# Patient Record
Sex: MALE | Race: WHITE | NOT HISPANIC OR LATINO | Employment: UNEMPLOYED | URBAN - NONMETROPOLITAN AREA
[De-identification: names, ages, dates, MRNs, and addresses within clinical notes are randomized per-mention and may not be internally consistent; named-entity substitution may affect disease eponyms.]

---

## 2017-04-12 ENCOUNTER — HOSPITAL ENCOUNTER (EMERGENCY)
Facility: HOSPITAL | Age: 37
Discharge: HOME | End: 2017-04-12
Admitting: EMERGENCY MEDICINE

## 2017-04-12 VITALS
WEIGHT: 140 LBS | HEIGHT: 73 IN | HEART RATE: 78 BPM | OXYGEN SATURATION: 99 % | BODY MASS INDEX: 18.55 KG/M2 | SYSTOLIC BLOOD PRESSURE: 108 MMHG | TEMPERATURE: 98.1 F | DIASTOLIC BLOOD PRESSURE: 87 MMHG | RESPIRATION RATE: 16 BRPM

## 2017-04-12 DIAGNOSIS — F19.10 POLYSUBSTANCE ABUSE (HCC): Primary | ICD-10-CM

## 2017-04-12 LAB
ALBUMIN SERPL-MCNC: 5 G/DL (ref 3.5–5)
ALBUMIN/GLOB SERPL: 2 G/DL (ref 1.5–2.5)
ALP SERPL-CCNC: 70 U/L (ref 40–129)
ALT SERPL W P-5'-P-CCNC: 15 U/L (ref 10–44)
AMPHET+METHAMPHET UR QL: POSITIVE
ANION GAP SERPL CALCULATED.3IONS-SCNC: 4.4 MMOL/L (ref 3.6–11.2)
AST SERPL-CCNC: 19 U/L (ref 10–34)
BARBITURATES UR QL SCN: NEGATIVE
BASOPHILS # BLD AUTO: 0.04 10*3/MM3 (ref 0–0.3)
BASOPHILS NFR BLD AUTO: 0.5 % (ref 0–2)
BENZODIAZ UR QL SCN: NEGATIVE
BILIRUB SERPL-MCNC: 0.4 MG/DL (ref 0.2–1.8)
BILIRUB UR QL STRIP: NEGATIVE
BUN BLD-MCNC: 21 MG/DL (ref 7–21)
BUN/CREAT SERPL: 22.6 (ref 7–25)
BUPRENORPHINE+NOR UR QL SCN: POSITIVE
CALCIUM SPEC-SCNC: 9.3 MG/DL (ref 7.7–10)
CANNABINOIDS SERPL QL: POSITIVE
CHLORIDE SERPL-SCNC: 105 MMOL/L (ref 99–112)
CLARITY UR: CLEAR
CO2 SERPL-SCNC: 29.6 MMOL/L (ref 24.3–31.9)
COCAINE UR QL: NEGATIVE
COLOR UR: ABNORMAL
CREAT BLD-MCNC: 0.93 MG/DL (ref 0.43–1.29)
DEPRECATED RDW RBC AUTO: 42.3 FL (ref 37–54)
EOSINOPHIL # BLD AUTO: 0.24 10*3/MM3 (ref 0–0.7)
EOSINOPHIL NFR BLD AUTO: 3.2 % (ref 0–5)
ERYTHROCYTE [DISTWIDTH] IN BLOOD BY AUTOMATED COUNT: 13 % (ref 11.5–14.5)
ETHANOL BLD-MCNC: <10 MG/DL
ETHANOL UR QL: <0.01 %
GFR SERPL CREATININE-BSD FRML MDRD: 91 ML/MIN/1.73
GLOBULIN UR ELPH-MCNC: 2.5 GM/DL
GLUCOSE BLD-MCNC: 113 MG/DL (ref 70–110)
GLUCOSE UR STRIP-MCNC: NEGATIVE MG/DL
HCT VFR BLD AUTO: 43.5 % (ref 42–52)
HGB BLD-MCNC: 14.4 G/DL (ref 14–18)
HGB UR QL STRIP.AUTO: NEGATIVE
IMM GRANULOCYTES # BLD: 0 10*3/MM3 (ref 0–0.03)
IMM GRANULOCYTES NFR BLD: 0 % (ref 0–0.5)
KETONES UR QL STRIP: ABNORMAL
LEUKOCYTE ESTERASE UR QL STRIP.AUTO: NEGATIVE
LYMPHOCYTES # BLD AUTO: 3.38 10*3/MM3 (ref 1–3)
LYMPHOCYTES NFR BLD AUTO: 44.9 % (ref 21–51)
MCH RBC QN AUTO: 30.2 PG (ref 27–33)
MCHC RBC AUTO-ENTMCNC: 33.1 G/DL (ref 33–37)
MCV RBC AUTO: 91.2 FL (ref 80–94)
METHADONE UR QL SCN: NEGATIVE
MONOCYTES # BLD AUTO: 0.82 10*3/MM3 (ref 0.1–0.9)
MONOCYTES NFR BLD AUTO: 10.9 % (ref 0–10)
NEUTROPHILS # BLD AUTO: 3.04 10*3/MM3 (ref 1.4–6.5)
NEUTROPHILS NFR BLD AUTO: 40.5 % (ref 30–70)
NITRITE UR QL STRIP: NEGATIVE
OPIATES UR QL: NEGATIVE
OSMOLALITY SERPL CALC.SUM OF ELEC: 281.3 MOSM/KG (ref 273–305)
OXYCODONE UR QL SCN: NEGATIVE
PCP UR QL SCN: NEGATIVE
PH UR STRIP.AUTO: <=5 [PH] (ref 5–8)
PLATELET # BLD AUTO: 347 10*3/MM3 (ref 130–400)
PMV BLD AUTO: 9.9 FL (ref 6–10)
POTASSIUM BLD-SCNC: 3.9 MMOL/L (ref 3.5–5.3)
PROPOXYPH UR QL: NEGATIVE
PROT SERPL-MCNC: 7.5 G/DL (ref 6–8)
PROT UR QL STRIP: NEGATIVE
RBC # BLD AUTO: 4.77 10*6/MM3 (ref 4.7–6.1)
SODIUM BLD-SCNC: 139 MMOL/L (ref 135–153)
SP GR UR STRIP: >1.03 (ref 1–1.03)
UROBILINOGEN UR QL STRIP: ABNORMAL
WBC NRBC COR # BLD: 7.52 10*3/MM3 (ref 4.5–12.5)

## 2017-04-12 PROCEDURE — 80307 DRUG TEST PRSMV CHEM ANLYZR: CPT | Performed by: EMERGENCY MEDICINE

## 2017-04-12 PROCEDURE — 85025 COMPLETE CBC W/AUTO DIFF WBC: CPT | Performed by: EMERGENCY MEDICINE

## 2017-04-12 PROCEDURE — 81003 URINALYSIS AUTO W/O SCOPE: CPT | Performed by: EMERGENCY MEDICINE

## 2017-04-12 PROCEDURE — 99284 EMERGENCY DEPT VISIT MOD MDM: CPT

## 2017-04-12 PROCEDURE — 80053 COMPREHEN METABOLIC PANEL: CPT | Performed by: EMERGENCY MEDICINE

## 2018-03-18 ENCOUNTER — HOSPITAL ENCOUNTER (EMERGENCY)
Facility: HOSPITAL | Age: 38
Discharge: HOME | End: 2018-03-19
Attending: EMERGENCY MEDICINE | Admitting: EMERGENCY MEDICINE

## 2018-03-18 ENCOUNTER — HOSPITAL ENCOUNTER (EMERGENCY)
Facility: HOSPITAL | Age: 38
Discharge: HOME | End: 2018-03-18
Attending: EMERGENCY MEDICINE | Admitting: EMERGENCY MEDICINE

## 2018-03-18 VITALS
HEART RATE: 60 BPM | OXYGEN SATURATION: 100 % | SYSTOLIC BLOOD PRESSURE: 127 MMHG | HEIGHT: 72 IN | WEIGHT: 150 LBS | DIASTOLIC BLOOD PRESSURE: 89 MMHG | TEMPERATURE: 97.7 F | RESPIRATION RATE: 18 BRPM | BODY MASS INDEX: 20.32 KG/M2

## 2018-03-18 VITALS
TEMPERATURE: 97.6 F | HEIGHT: 72 IN | HEART RATE: 56 BPM | BODY MASS INDEX: 18.96 KG/M2 | OXYGEN SATURATION: 99 % | DIASTOLIC BLOOD PRESSURE: 88 MMHG | SYSTOLIC BLOOD PRESSURE: 147 MMHG | WEIGHT: 140 LBS | RESPIRATION RATE: 18 BRPM

## 2018-03-18 DIAGNOSIS — F19.10 POLYSUBSTANCE ABUSE (HCC): Primary | ICD-10-CM

## 2018-03-18 DIAGNOSIS — F19.10 SUBSTANCE ABUSE (HCC): Primary | ICD-10-CM

## 2018-03-18 LAB
6-ACETYL MORPHINE: NEGATIVE
ALBUMIN SERPL-MCNC: 4.3 G/DL (ref 3.5–5)
ALBUMIN/GLOB SERPL: 1.5 G/DL (ref 1.5–2.5)
ALP SERPL-CCNC: 88 U/L (ref 40–129)
ALT SERPL W P-5'-P-CCNC: 18 U/L (ref 10–44)
AMPHET+METHAMPHET UR QL: NEGATIVE
ANION GAP SERPL CALCULATED.3IONS-SCNC: 3.8 MMOL/L (ref 3.6–11.2)
AST SERPL-CCNC: 20 U/L (ref 10–34)
BARBITURATES UR QL SCN: NEGATIVE
BASOPHILS # BLD AUTO: 0.03 10*3/MM3 (ref 0–0.3)
BASOPHILS NFR BLD AUTO: 0.4 % (ref 0–2)
BENZODIAZ UR QL SCN: NEGATIVE
BILIRUB SERPL-MCNC: 0.2 MG/DL (ref 0.2–1.8)
BILIRUB UR QL STRIP: NEGATIVE
BUN BLD-MCNC: 15 MG/DL (ref 7–21)
BUN/CREAT SERPL: 17 (ref 7–25)
BUPRENORPHINE SERPL-MCNC: NEGATIVE NG/ML
CALCIUM SPEC-SCNC: 9.1 MG/DL (ref 7.7–10)
CANNABINOIDS SERPL QL: POSITIVE
CHLORIDE SERPL-SCNC: 108 MMOL/L (ref 99–112)
CLARITY UR: CLEAR
CO2 SERPL-SCNC: 28.2 MMOL/L (ref 24.3–31.9)
COCAINE UR QL: NEGATIVE
COLOR UR: YELLOW
CREAT BLD-MCNC: 0.88 MG/DL (ref 0.43–1.29)
DEPRECATED RDW RBC AUTO: 42.7 FL (ref 37–54)
EOSINOPHIL # BLD AUTO: 0.15 10*3/MM3 (ref 0–0.7)
EOSINOPHIL NFR BLD AUTO: 1.8 % (ref 0–5)
ERYTHROCYTE [DISTWIDTH] IN BLOOD BY AUTOMATED COUNT: 13.5 % (ref 11.5–14.5)
ETHANOL BLD-MCNC: <10 MG/DL
ETHANOL UR QL: <0.01 %
GFR SERPL CREATININE-BSD FRML MDRD: 97 ML/MIN/1.73
GLOBULIN UR ELPH-MCNC: 2.9 GM/DL
GLUCOSE BLD-MCNC: 84 MG/DL (ref 70–110)
GLUCOSE UR STRIP-MCNC: NEGATIVE MG/DL
HCT VFR BLD AUTO: 43.6 % (ref 42–52)
HGB BLD-MCNC: 14.5 G/DL (ref 14–18)
HGB UR QL STRIP.AUTO: NEGATIVE
IMM GRANULOCYTES # BLD: 0.01 10*3/MM3 (ref 0–0.03)
IMM GRANULOCYTES NFR BLD: 0.1 % (ref 0–0.5)
KETONES UR QL STRIP: NEGATIVE
LEUKOCYTE ESTERASE UR QL STRIP.AUTO: NEGATIVE
LYMPHOCYTES # BLD AUTO: 3.01 10*3/MM3 (ref 1–3)
LYMPHOCYTES NFR BLD AUTO: 35.8 % (ref 21–51)
MCH RBC QN AUTO: 29.4 PG (ref 27–33)
MCHC RBC AUTO-ENTMCNC: 33.3 G/DL (ref 33–37)
MCV RBC AUTO: 88.3 FL (ref 80–94)
METHADONE UR QL SCN: NEGATIVE
MONOCYTES # BLD AUTO: 0.49 10*3/MM3 (ref 0.1–0.9)
MONOCYTES NFR BLD AUTO: 5.8 % (ref 0–10)
NEUTROPHILS # BLD AUTO: 4.72 10*3/MM3 (ref 1.4–6.5)
NEUTROPHILS NFR BLD AUTO: 56.1 % (ref 30–70)
NITRITE UR QL STRIP: NEGATIVE
OPIATES UR QL: NEGATIVE
OSMOLALITY SERPL CALC.SUM OF ELEC: 279.4 MOSM/KG (ref 273–305)
OXYCODONE UR QL SCN: NEGATIVE
PCP UR QL SCN: NEGATIVE
PH UR STRIP.AUTO: 7.5 [PH] (ref 5–8)
PLATELET # BLD AUTO: 419 10*3/MM3 (ref 130–400)
PMV BLD AUTO: 9.3 FL (ref 6–10)
POTASSIUM BLD-SCNC: 4.4 MMOL/L (ref 3.5–5.3)
PROT SERPL-MCNC: 7.2 G/DL (ref 6–8)
PROT UR QL STRIP: NEGATIVE
RBC # BLD AUTO: 4.94 10*6/MM3 (ref 4.7–6.1)
SODIUM BLD-SCNC: 140 MMOL/L (ref 135–153)
SP GR UR STRIP: 1.02 (ref 1–1.03)
UROBILINOGEN UR QL STRIP: NORMAL
WBC NRBC COR # BLD: 8.41 10*3/MM3 (ref 4.5–12.5)

## 2018-03-18 PROCEDURE — 80307 DRUG TEST PRSMV CHEM ANLYZR: CPT | Performed by: PHYSICIAN ASSISTANT

## 2018-03-18 PROCEDURE — 81003 URINALYSIS AUTO W/O SCOPE: CPT | Performed by: PHYSICIAN ASSISTANT

## 2018-03-18 PROCEDURE — 99284 EMERGENCY DEPT VISIT MOD MDM: CPT

## 2018-03-18 PROCEDURE — 80053 COMPREHEN METABOLIC PANEL: CPT | Performed by: PHYSICIAN ASSISTANT

## 2018-03-18 PROCEDURE — 85025 COMPLETE CBC W/AUTO DIFF WBC: CPT | Performed by: PHYSICIAN ASSISTANT

## 2018-03-18 NOTE — ED PROVIDER NOTES
Subjective     Mental Health Problem   Presenting symptoms: no homicidal ideas, no suicidal thoughts, no suicidal threats and no suicide attempt    Presenting symptoms comment:  Substance abuse  Degree of incapacity (severity):  Moderate  Onset quality:  Gradual  Duration: Several months ago.  Timing:  Constant  Progression:  Worsening  Chronicity:  Chronic  Context: drug abuse (methamphetamine and Suboxone)    Context: not alcohol use    Relieved by:  Nothing  Worsened by:  Nothing  Associated symptoms: feelings of worthlessness    Associated symptoms: no abdominal pain and no chest pain    Associated symptoms comment:  Patient reports that he previously had suicidal ideation though has not had any of this recently.      Review of Systems   Constitutional: Negative.  Negative for fever.   HENT: Negative.    Respiratory: Negative.    Cardiovascular: Negative.  Negative for chest pain.   Gastrointestinal: Negative.  Negative for abdominal pain.   Endocrine: Negative.    Genitourinary: Negative.  Negative for dysuria.   Skin: Negative.    Neurological: Negative.    Psychiatric/Behavioral: Negative.  Negative for homicidal ideas and suicidal ideas.   All other systems reviewed and are negative.      Past Medical History:   Diagnosis Date   • Arthritis    • Substance abuse    • Withdrawal symptoms, drug or narcotic        No Known Allergies    History reviewed. No pertinent surgical history.    Family History   Problem Relation Age of Onset   • Bipolar disorder Mother    • No Known Problems Father    • No Known Problems Sister    • No Known Problems Brother    • No Known Problems Maternal Aunt    • No Known Problems Paternal Aunt    • No Known Problems Maternal Uncle    • No Known Problems Paternal Uncle    • No Known Problems Maternal Grandfather    • No Known Problems Maternal Grandmother    • No Known Problems Paternal Grandfather    • No Known Problems Paternal Grandmother    • No Known Problems Cousin        Social  History     Social History   • Marital status:      Social History Main Topics   • Smoking status: Current Every Day Smoker     Packs/day: 1.50     Years: 15.00     Types: Cigarettes   • Smokeless tobacco: Never Used      Comment: Pt. declines counseling at this time.    • Alcohol use No      Comment: Occasional ETOH - Beer - 2 Cans   • Drug use:      Types: IV, Amphetamines, Hydrocodone      Comment: Suboxone, Neurontin, Tramadol   • Sexual activity: Not Currently     Partners: Female     Birth control/ protection: None      Comment: Denies.      Other Topics Concern   • Not on file           Objective   Physical Exam   Constitutional: He is oriented to person, place, and time. He appears well-developed and well-nourished. No distress.   HENT:   Head: Normocephalic and atraumatic.   Right Ear: External ear normal.   Left Ear: External ear normal.   Nose: Nose normal.   Eyes: Conjunctivae and EOM are normal. Pupils are equal, round, and reactive to light.   Neck: Normal range of motion. Neck supple. No JVD present. No tracheal deviation present.   Cardiovascular: Normal rate, regular rhythm and normal heart sounds.    No murmur heard.  Pulmonary/Chest: Effort normal and breath sounds normal. No respiratory distress. He has no wheezes.   Abdominal: Soft. Bowel sounds are normal. There is no tenderness.   Musculoskeletal: Normal range of motion. He exhibits no edema or deformity.   Neurological: He is alert and oriented to person, place, and time. No cranial nerve deficit.   Skin: Skin is warm and dry. No rash noted. He is not diaphoretic. No erythema. No pallor.   Psychiatric: He has a normal mood and affect. His behavior is normal. Thought content normal.   Nursing note and vitals reviewed.      Procedures         ED Course  ED Course                  MDM  Number of Diagnoses or Management Options  Substance abuse: established and worsening     Amount and/or Complexity of Data Reviewed  Clinical lab tests:  reviewed and ordered  Discuss the patient with other providers: yes    Risk of Complications, Morbidity, and/or Mortality  Presenting problems: moderate        Final diagnoses:   Substance abuse            BRINDA Park  03/18/18 1506       BRINDA Park  03/18/18 1506

## 2018-03-18 NOTE — NURSING NOTE
Dr. Mari notified of intake assessment, laboratory results, verbalizes understanding and reports new order to discharge home and follow-up with outpatient referral. RBTO x 2.

## 2018-03-18 NOTE — NURSING NOTE
Patient to intake per ED staff.  Pockets emptied and through search complete.  Patient searched by intake nurse and witnessed (EFRAÍN Lopez) per ED Policy 100.  Belongings logged on Personal Belongings Inventory Sheet.  Patient placed in hospital attire.  Room swept for any potential safety hazards, room cleared, and patient placed in treatment room.  Patient ready for evaluation.

## 2018-03-19 LAB
6-ACETYL MORPHINE: NEGATIVE
ALBUMIN SERPL-MCNC: 3.9 G/DL (ref 3.5–5)
ALBUMIN/GLOB SERPL: 1.5 G/DL (ref 1.5–2.5)
ALP SERPL-CCNC: 76 U/L (ref 40–129)
ALT SERPL W P-5'-P-CCNC: 22 U/L (ref 10–44)
AMPHET+METHAMPHET UR QL: NEGATIVE
ANION GAP SERPL CALCULATED.3IONS-SCNC: 3.4 MMOL/L (ref 3.6–11.2)
AST SERPL-CCNC: 21 U/L (ref 10–34)
BARBITURATES UR QL SCN: NEGATIVE
BASOPHILS # BLD AUTO: 0.04 10*3/MM3 (ref 0–0.3)
BASOPHILS NFR BLD AUTO: 0.5 % (ref 0–2)
BENZODIAZ UR QL SCN: NEGATIVE
BILIRUB SERPL-MCNC: 0.1 MG/DL (ref 0.2–1.8)
BILIRUB UR QL STRIP: NEGATIVE
BUN BLD-MCNC: 13 MG/DL (ref 7–21)
BUN/CREAT SERPL: 17.3 (ref 7–25)
BUPRENORPHINE SERPL-MCNC: NEGATIVE NG/ML
CALCIUM SPEC-SCNC: 8.8 MG/DL (ref 7.7–10)
CANNABINOIDS SERPL QL: POSITIVE
CHLORIDE SERPL-SCNC: 117 MMOL/L (ref 99–112)
CLARITY UR: CLEAR
CO2 SERPL-SCNC: 27.6 MMOL/L (ref 24.3–31.9)
COCAINE UR QL: NEGATIVE
COLOR UR: YELLOW
CREAT BLD-MCNC: 0.75 MG/DL (ref 0.43–1.29)
DEPRECATED RDW RBC AUTO: 42.3 FL (ref 37–54)
EOSINOPHIL # BLD AUTO: 0.17 10*3/MM3 (ref 0–0.7)
EOSINOPHIL NFR BLD AUTO: 2.1 % (ref 0–5)
ERYTHROCYTE [DISTWIDTH] IN BLOOD BY AUTOMATED COUNT: 13.2 % (ref 11.5–14.5)
ETHANOL BLD-MCNC: <10 MG/DL
ETHANOL UR QL: <0.01 %
GFR SERPL CREATININE-BSD FRML MDRD: 117 ML/MIN/1.73
GLOBULIN UR ELPH-MCNC: 2.6 GM/DL
GLUCOSE BLD-MCNC: 104 MG/DL (ref 70–110)
GLUCOSE UR STRIP-MCNC: NEGATIVE MG/DL
HCT VFR BLD AUTO: 39.1 % (ref 42–52)
HGB BLD-MCNC: 12.8 G/DL (ref 14–18)
HGB UR QL STRIP.AUTO: NEGATIVE
IMM GRANULOCYTES # BLD: 0.02 10*3/MM3 (ref 0–0.03)
IMM GRANULOCYTES NFR BLD: 0.2 % (ref 0–0.5)
KETONES UR QL STRIP: NEGATIVE
LEUKOCYTE ESTERASE UR QL STRIP.AUTO: NEGATIVE
LYMPHOCYTES # BLD AUTO: 3.06 10*3/MM3 (ref 1–3)
LYMPHOCYTES NFR BLD AUTO: 37.5 % (ref 21–51)
MCH RBC QN AUTO: 28.8 PG (ref 27–33)
MCHC RBC AUTO-ENTMCNC: 32.7 G/DL (ref 33–37)
MCV RBC AUTO: 87.9 FL (ref 80–94)
METHADONE UR QL SCN: NEGATIVE
MONOCYTES # BLD AUTO: 0.66 10*3/MM3 (ref 0.1–0.9)
MONOCYTES NFR BLD AUTO: 8.1 % (ref 0–10)
NEUTROPHILS # BLD AUTO: 4.2 10*3/MM3 (ref 1.4–6.5)
NEUTROPHILS NFR BLD AUTO: 51.6 % (ref 30–70)
NITRITE UR QL STRIP: NEGATIVE
OPIATES UR QL: NEGATIVE
OSMOLALITY SERPL CALC.SUM OF ELEC: 294.7 MOSM/KG (ref 273–305)
OXYCODONE UR QL SCN: NEGATIVE
PCP UR QL SCN: NEGATIVE
PH UR STRIP.AUTO: 6 [PH] (ref 5–8)
PLATELET # BLD AUTO: 385 10*3/MM3 (ref 130–400)
PMV BLD AUTO: 8.9 FL (ref 6–10)
POTASSIUM BLD-SCNC: 4 MMOL/L (ref 3.5–5.3)
PROT SERPL-MCNC: 6.5 G/DL (ref 6–8)
PROT UR QL STRIP: NEGATIVE
RBC # BLD AUTO: 4.45 10*6/MM3 (ref 4.7–6.1)
SODIUM BLD-SCNC: 148 MMOL/L (ref 135–153)
SP GR UR STRIP: 1.02 (ref 1–1.03)
UROBILINOGEN UR QL STRIP: NORMAL
WBC NRBC COR # BLD: 8.15 10*3/MM3 (ref 4.5–12.5)

## 2018-03-19 PROCEDURE — 80307 DRUG TEST PRSMV CHEM ANLYZR: CPT | Performed by: NURSE PRACTITIONER

## 2018-03-19 PROCEDURE — 85025 COMPLETE CBC W/AUTO DIFF WBC: CPT | Performed by: NURSE PRACTITIONER

## 2018-03-19 PROCEDURE — 81003 URINALYSIS AUTO W/O SCOPE: CPT | Performed by: NURSE PRACTITIONER

## 2018-03-19 PROCEDURE — 80053 COMPREHEN METABOLIC PANEL: CPT | Performed by: NURSE PRACTITIONER

## 2018-03-19 NOTE — NURSING NOTE
"Intake assessment completed. Pt brought in by his sister. Reports he was here earlier today but didn't meet criteria for admission. Reports he has not used anything today and is back hoping to meet criteria. Reports he is here because his sister won't let him see his kids until he gets \"clean.\" Pt reports he takes Suboxone 2mg IV off and on for the past 2 years, he last used 2 days ago; THC - 1 joint a day for 20 years, he last used yesterday; Meth - $20 bag/day IV, last used 3-4 days ago; Norco 10mg PO 3-4/day x 8 years, last used 1 week ago; Percocet 10mg PO 2-3/day x 8 years, last used 2 weeks ago. Reports withdrawal as vomiting, muscle cramps, feeling hot/cold, tingling in spine, craving @ 10/10. COWS score 10. Drinks ETOH rarely, had 1 beer today. Pt smokes 1 1/2 pks of cigarettes/day x 16 years. Pt denies SI at this time but has had SI in the past, denies Hi. Rates anxiety and depression at 10/10. Denies hallucinations unless he is taking meth, and then he see's people. Reports sleep and appetite are both poor. Intake process explained to patient. Any questions answered. Pt placed in TX room. Safety maintained.   "

## 2018-03-19 NOTE — ED PROVIDER NOTES
Subjective   Patient comes to ED for detox of methamphetamine and suboxone. Was seen earlier this date for same complaint.         History provided by:  Patient   used: No    Drug / Alcohol Assessment   Primary symptoms include no confusion, no somnolence, no violence, and no intoxication. This is a recurrent problem. The current episode started more than 2 days ago. The problem has not changed since onset.Suspected agents include methamphetamines and prescription drugs. Pertinent negatives include no fever, no injury, no nausea, no vomiting, no bladder incontinence and no bowel incontinence.       Review of Systems   Constitutional: Negative.  Negative for fever.   HENT: Negative.    Respiratory: Negative.    Cardiovascular: Negative.  Negative for chest pain.   Gastrointestinal: Negative.  Negative for abdominal pain, bowel incontinence, nausea and vomiting.   Endocrine: Negative.    Genitourinary: Negative.  Negative for bladder incontinence and dysuria.   Skin: Negative.    Neurological: Negative.    Psychiatric/Behavioral: Negative.  Negative for confusion.   All other systems reviewed and are negative.      Past Medical History:   Diagnosis Date   • Anxiety    • Arthritis    • Depression    • Rheumatoid arthritis    • Substance abuse    • Withdrawal symptoms, drug or narcotic        No Known Allergies    History reviewed. No pertinent surgical history.    Family History   Problem Relation Age of Onset   • Bipolar disorder Mother    • No Known Problems Father    • No Known Problems Sister    • No Known Problems Brother    • No Known Problems Maternal Aunt    • No Known Problems Paternal Aunt    • No Known Problems Maternal Uncle    • No Known Problems Paternal Uncle    • No Known Problems Maternal Grandfather    • No Known Problems Maternal Grandmother    • No Known Problems Paternal Grandfather    • No Known Problems Paternal Grandmother    • No Known Problems Cousin        Social History      Social History   • Marital status:      Social History Main Topics   • Smoking status: Current Every Day Smoker     Packs/day: 1.50     Years: 15.00     Types: Cigarettes   • Smokeless tobacco: Never Used      Comment: Pt. declines counseling at this time.    • Alcohol use No      Comment: Occasional ETOH - Beer - 2 Cans   • Drug use:      Types: IV, Amphetamines, Hydrocodone, Oxycodone      Comment: Suboxone, Neurontin, Tramadol   • Sexual activity: Not Currently     Partners: Female     Birth control/ protection: None      Comment: Denies.      Other Topics Concern   • Not on file           Objective   Physical Exam   Constitutional: He is oriented to person, place, and time. He appears well-developed and well-nourished.   HENT:   Head: Normocephalic.   Eyes: EOM are normal. Pupils are equal, round, and reactive to light.   Neck: Normal range of motion. Neck supple.   Cardiovascular: Normal rate, regular rhythm, normal heart sounds and intact distal pulses.    Pulmonary/Chest: Effort normal and breath sounds normal.   Abdominal: Soft. Bowel sounds are normal.   Musculoskeletal: Normal range of motion.   Neurological: He is alert and oriented to person, place, and time.   Skin: Skin is warm and dry. Capillary refill takes less than 2 seconds.   Psychiatric: He has a normal mood and affect. His behavior is normal. Judgment and thought content normal.   Nursing note and vitals reviewed.      Procedures         ED Course  ED Course                  MDM  Number of Diagnoses or Management Options  Polysubstance abuse: established and worsening     Amount and/or Complexity of Data Reviewed  Clinical lab tests: ordered and reviewed  Tests in the medicine section of CPT®: reviewed and ordered    Risk of Complications, Morbidity, and/or Mortality  Presenting problems: moderate  Diagnostic procedures: moderate  Management options: moderate  General comments: Patient does not appear to be in acute withdrawal. Calm,  vital signs stable    Patient Progress  Patient progress: improved      Final diagnoses:   Polysubstance abuse            Janeen Alfred, APRN  03/19/18 0039

## 2018-03-19 NOTE — NURSING NOTE
Patient pockets emptied. Search completed with two staff members present. Items logged and placed in the cabinet in intake area. Pt placed in safe room for evaluation. Will continue to monitor pt status. Waiting for ED provider to clear pt medically.

## 2020-07-11 ENCOUNTER — HOSPITAL ENCOUNTER (INPATIENT)
Facility: HOSPITAL | Age: 40
End: 2020-07-11
Attending: PSYCHIATRY & NEUROLOGY | Admitting: PSYCHIATRY & NEUROLOGY

## 2020-08-10 ENCOUNTER — HOSPITAL ENCOUNTER (EMERGENCY)
Facility: HOSPITAL | Age: 40
End: 2020-08-10

## 2020-08-19 ENCOUNTER — HOSPITAL ENCOUNTER (EMERGENCY)
Facility: HOSPITAL | Age: 40
Discharge: PSYCHIATRIC OR SUBSTANCE USE FACILITY | End: 2020-08-20
Attending: EMERGENCY MEDICINE | Admitting: EMERGENCY MEDICINE

## 2020-08-19 DIAGNOSIS — F32.A DEPRESSION WITH SUICIDAL IDEATION: Primary | ICD-10-CM

## 2020-08-19 DIAGNOSIS — R45.851 DEPRESSION WITH SUICIDAL IDEATION: Primary | ICD-10-CM

## 2020-08-19 PROCEDURE — 80307 DRUG TEST PRSMV CHEM ANLYZR: CPT | Performed by: PHYSICIAN ASSISTANT

## 2020-08-19 PROCEDURE — 99285 EMERGENCY DEPT VISIT HI MDM: CPT

## 2020-08-19 PROCEDURE — 83735 ASSAY OF MAGNESIUM: CPT | Performed by: PHYSICIAN ASSISTANT

## 2020-08-19 PROCEDURE — 36415 COLL VENOUS BLD VENIPUNCTURE: CPT

## 2020-08-19 PROCEDURE — 85025 COMPLETE CBC W/AUTO DIFF WBC: CPT | Performed by: PHYSICIAN ASSISTANT

## 2020-08-19 PROCEDURE — 80053 COMPREHEN METABOLIC PANEL: CPT | Performed by: PHYSICIAN ASSISTANT

## 2020-08-20 ENCOUNTER — HOSPITAL ENCOUNTER (INPATIENT)
Facility: HOSPITAL | Age: 40
LOS: 1 days | Discharge: HOME | End: 2020-08-21
Attending: PSYCHIATRY & NEUROLOGY | Admitting: PSYCHIATRY & NEUROLOGY

## 2020-08-20 VITALS
HEART RATE: 83 BPM | RESPIRATION RATE: 18 BRPM | OXYGEN SATURATION: 96 % | SYSTOLIC BLOOD PRESSURE: 142 MMHG | DIASTOLIC BLOOD PRESSURE: 94 MMHG | WEIGHT: 173 LBS | TEMPERATURE: 98.9 F | BODY MASS INDEX: 23.43 KG/M2 | HEIGHT: 72 IN

## 2020-08-20 DIAGNOSIS — F11.20 SEVERE OPIOID USE DISORDER ON MAINTENANCE THERAPY (HCC): Primary | ICD-10-CM

## 2020-08-20 PROBLEM — F12.20 TETRAHYDROCANNABINOL (THC) USE DISORDER, SEVERE, DEPENDENCE (HCC): Status: ACTIVE | Noted: 2020-08-20

## 2020-08-20 PROBLEM — F17.200 TOBACCO USE DISORDER: Status: ACTIVE | Noted: 2020-08-20

## 2020-08-20 PROBLEM — F32.9 MDD (MAJOR DEPRESSIVE DISORDER): Status: ACTIVE | Noted: 2020-08-20

## 2020-08-20 PROBLEM — F15.20 METHAMPHETAMINE USE DISORDER, SEVERE (HCC): Status: ACTIVE | Noted: 2020-08-20

## 2020-08-20 PROBLEM — F33.2 SEVERE EPISODE OF RECURRENT MAJOR DEPRESSIVE DISORDER, WITHOUT PSYCHOTIC FEATURES (HCC): Status: ACTIVE | Noted: 2020-08-20

## 2020-08-20 LAB
6-ACETYL MORPHINE: NEGATIVE
ALBUMIN SERPL-MCNC: 4.83 G/DL (ref 3.5–5.2)
ALBUMIN/GLOB SERPL: 1.7 G/DL
ALP SERPL-CCNC: 63 U/L (ref 39–117)
ALT SERPL W P-5'-P-CCNC: 22 U/L (ref 1–41)
AMPHET+METHAMPHET UR QL: POSITIVE
ANION GAP SERPL CALCULATED.3IONS-SCNC: 10.9 MMOL/L (ref 5–15)
AST SERPL-CCNC: 29 U/L (ref 1–40)
BACTERIA UR QL AUTO: ABNORMAL /HPF
BARBITURATES UR QL SCN: NEGATIVE
BASOPHILS # BLD AUTO: 0.05 10*3/MM3 (ref 0–0.2)
BASOPHILS NFR BLD AUTO: 0.7 % (ref 0–1.5)
BENZODIAZ UR QL SCN: NEGATIVE
BILIRUB SERPL-MCNC: 0.6 MG/DL (ref 0–1.2)
BILIRUB UR QL STRIP: ABNORMAL
BUN SERPL-MCNC: 19 MG/DL (ref 6–20)
BUN/CREAT SERPL: 22.6 (ref 7–25)
BUPRENORPHINE SERPL-MCNC: POSITIVE NG/ML
CALCIUM SPEC-SCNC: 9.3 MG/DL (ref 8.6–10.5)
CANNABINOIDS SERPL QL: POSITIVE
CHLORIDE SERPL-SCNC: 101 MMOL/L (ref 98–107)
CLARITY UR: CLEAR
CO2 SERPL-SCNC: 27.1 MMOL/L (ref 22–29)
COCAINE UR QL: NEGATIVE
COLOR UR: ABNORMAL
CREAT SERPL-MCNC: 0.84 MG/DL (ref 0.76–1.27)
DEPRECATED RDW RBC AUTO: 41.2 FL (ref 37–54)
EOSINOPHIL # BLD AUTO: 0.17 10*3/MM3 (ref 0–0.4)
EOSINOPHIL NFR BLD AUTO: 2.3 % (ref 0.3–6.2)
ERYTHROCYTE [DISTWIDTH] IN BLOOD BY AUTOMATED COUNT: 12.6 % (ref 12.3–15.4)
ETHANOL BLD-MCNC: <10 MG/DL (ref 0–10)
ETHANOL UR QL: <0.01 %
GFR SERPL CREATININE-BSD FRML MDRD: 101 ML/MIN/1.73
GLOBULIN UR ELPH-MCNC: 2.8 GM/DL
GLUCOSE SERPL-MCNC: 97 MG/DL (ref 65–99)
GLUCOSE UR STRIP-MCNC: NEGATIVE MG/DL
HCT VFR BLD AUTO: 41 % (ref 37.5–51)
HGB BLD-MCNC: 13.5 G/DL (ref 13–17.7)
HGB UR QL STRIP.AUTO: NEGATIVE
HYALINE CASTS UR QL AUTO: ABNORMAL /LPF
IMM GRANULOCYTES # BLD AUTO: 0.02 10*3/MM3 (ref 0–0.05)
IMM GRANULOCYTES NFR BLD AUTO: 0.3 % (ref 0–0.5)
KETONES UR QL STRIP: NEGATIVE
LEUKOCYTE ESTERASE UR QL STRIP.AUTO: ABNORMAL
LYMPHOCYTES # BLD AUTO: 2.41 10*3/MM3 (ref 0.7–3.1)
LYMPHOCYTES NFR BLD AUTO: 32.7 % (ref 19.6–45.3)
MAGNESIUM SERPL-MCNC: 2.1 MG/DL (ref 1.6–2.6)
MCH RBC QN AUTO: 30.1 PG (ref 26.6–33)
MCHC RBC AUTO-ENTMCNC: 32.9 G/DL (ref 31.5–35.7)
MCV RBC AUTO: 91.3 FL (ref 79–97)
METHADONE UR QL SCN: NEGATIVE
MONOCYTES # BLD AUTO: 1.11 10*3/MM3 (ref 0.1–0.9)
MONOCYTES NFR BLD AUTO: 15.1 % (ref 5–12)
NEUTROPHILS NFR BLD AUTO: 3.6 10*3/MM3 (ref 1.7–7)
NEUTROPHILS NFR BLD AUTO: 48.9 % (ref 42.7–76)
NITRITE UR QL STRIP: NEGATIVE
NRBC BLD AUTO-RTO: 0 /100 WBC (ref 0–0.2)
OPIATES UR QL: NEGATIVE
OXYCODONE UR QL SCN: NEGATIVE
PCP UR QL SCN: NEGATIVE
PH UR STRIP.AUTO: 5 [PH] (ref 5–8)
PLATELET # BLD AUTO: 256 10*3/MM3 (ref 140–450)
PMV BLD AUTO: 10 FL (ref 6–12)
POTASSIUM SERPL-SCNC: 3.6 MMOL/L (ref 3.5–5.2)
PROT SERPL-MCNC: 7.6 G/DL (ref 6–8.5)
PROT UR QL STRIP: ABNORMAL
RBC # BLD AUTO: 4.49 10*6/MM3 (ref 4.14–5.8)
RBC # UR: ABNORMAL /HPF
REF LAB TEST METHOD: ABNORMAL
SODIUM SERPL-SCNC: 139 MMOL/L (ref 136–145)
SP GR UR STRIP: >1.03 (ref 1–1.03)
SQUAMOUS #/AREA URNS HPF: ABNORMAL /HPF
UROBILINOGEN UR QL STRIP: ABNORMAL
WBC # BLD AUTO: 7.36 10*3/MM3 (ref 3.4–10.8)
WBC UR QL AUTO: ABNORMAL /HPF

## 2020-08-20 PROCEDURE — 80307 DRUG TEST PRSMV CHEM ANLYZR: CPT | Performed by: PHYSICIAN ASSISTANT

## 2020-08-20 PROCEDURE — 93010 ELECTROCARDIOGRAM REPORT: CPT | Performed by: INTERNAL MEDICINE

## 2020-08-20 PROCEDURE — 99223 1ST HOSP IP/OBS HIGH 75: CPT | Performed by: PSYCHIATRY & NEUROLOGY

## 2020-08-20 PROCEDURE — 81001 URINALYSIS AUTO W/SCOPE: CPT | Performed by: PHYSICIAN ASSISTANT

## 2020-08-20 PROCEDURE — 93005 ELECTROCARDIOGRAM TRACING: CPT | Performed by: PSYCHIATRY & NEUROLOGY

## 2020-08-20 RX ORDER — BENZTROPINE MESYLATE 1 MG/1
2 TABLET ORAL ONCE AS NEEDED
Status: DISCONTINUED | OUTPATIENT
Start: 2020-08-20 | End: 2020-08-21 | Stop reason: HOSPADM

## 2020-08-20 RX ORDER — CYCLOBENZAPRINE HCL 10 MG
10 TABLET ORAL 3 TIMES DAILY PRN
Status: DISCONTINUED | OUTPATIENT
Start: 2020-08-20 | End: 2020-08-21 | Stop reason: HOSPADM

## 2020-08-20 RX ORDER — ECHINACEA PURPUREA EXTRACT 125 MG
2 TABLET ORAL AS NEEDED
Status: DISCONTINUED | OUTPATIENT
Start: 2020-08-20 | End: 2020-08-21 | Stop reason: HOSPADM

## 2020-08-20 RX ORDER — FAMOTIDINE 20 MG/1
20 TABLET, FILM COATED ORAL 2 TIMES DAILY PRN
Status: DISCONTINUED | OUTPATIENT
Start: 2020-08-20 | End: 2020-08-21 | Stop reason: HOSPADM

## 2020-08-20 RX ORDER — ALUMINA, MAGNESIA, AND SIMETHICONE 2400; 2400; 240 MG/30ML; MG/30ML; MG/30ML
15 SUSPENSION ORAL EVERY 6 HOURS PRN
Status: DISCONTINUED | OUTPATIENT
Start: 2020-08-20 | End: 2020-08-21 | Stop reason: HOSPADM

## 2020-08-20 RX ORDER — QUETIAPINE FUMARATE 100 MG/1
25 TABLET, FILM COATED ORAL NIGHTLY
Status: DISCONTINUED | OUTPATIENT
Start: 2020-08-20 | End: 2020-08-20

## 2020-08-20 RX ORDER — GABAPENTIN 400 MG/1
400 CAPSULE ORAL NIGHTLY
COMMUNITY

## 2020-08-20 RX ORDER — BUPRENORPHINE HYDROCHLORIDE AND NALOXONE HYDROCHLORIDE DIHYDRATE 8; 2 MG/1; MG/1
1 TABLET SUBLINGUAL 2 TIMES DAILY
Status: CANCELLED | OUTPATIENT
Start: 2020-08-20

## 2020-08-20 RX ORDER — DICYCLOMINE HYDROCHLORIDE 10 MG/1
10 CAPSULE ORAL 3 TIMES DAILY PRN
Status: DISCONTINUED | OUTPATIENT
Start: 2020-08-20 | End: 2020-08-21 | Stop reason: HOSPADM

## 2020-08-20 RX ORDER — LOPERAMIDE HYDROCHLORIDE 2 MG/1
2 CAPSULE ORAL
Status: DISCONTINUED | OUTPATIENT
Start: 2020-08-20 | End: 2020-08-21 | Stop reason: HOSPADM

## 2020-08-20 RX ORDER — CLONIDINE HYDROCHLORIDE 0.1 MG/1
0.1 TABLET ORAL 2 TIMES DAILY PRN
Status: DISCONTINUED | OUTPATIENT
Start: 2020-08-23 | End: 2020-08-20

## 2020-08-20 RX ORDER — ESCITALOPRAM OXALATE 10 MG/1
10 TABLET ORAL DAILY
Status: DISCONTINUED | OUTPATIENT
Start: 2020-08-20 | End: 2020-08-21 | Stop reason: HOSPADM

## 2020-08-20 RX ORDER — NICOTINE 21 MG/24HR
1 PATCH, TRANSDERMAL 24 HOURS TRANSDERMAL
Status: DISCONTINUED | OUTPATIENT
Start: 2020-08-20 | End: 2020-08-21 | Stop reason: HOSPADM

## 2020-08-20 RX ORDER — LANOLIN ALCOHOL/MO/W.PET/CERES
1000 CREAM (GRAM) TOPICAL DAILY
COMMUNITY

## 2020-08-20 RX ORDER — CLONIDINE HYDROCHLORIDE 0.1 MG/1
0.1 TABLET ORAL DAILY PRN
Status: DISCONTINUED | OUTPATIENT
Start: 2020-08-24 | End: 2020-08-20

## 2020-08-20 RX ORDER — CLONIDINE HYDROCHLORIDE 0.1 MG/1
0.1 TABLET ORAL 4 TIMES DAILY PRN
Status: DISCONTINUED | OUTPATIENT
Start: 2020-08-21 | End: 2020-08-20

## 2020-08-20 RX ORDER — ONDANSETRON 4 MG/1
4 TABLET, FILM COATED ORAL EVERY 6 HOURS PRN
Status: DISCONTINUED | OUTPATIENT
Start: 2020-08-20 | End: 2020-08-21 | Stop reason: HOSPADM

## 2020-08-20 RX ORDER — CLONIDINE HYDROCHLORIDE 0.1 MG/1
0.1 TABLET ORAL 3 TIMES DAILY PRN
Status: DISCONTINUED | OUTPATIENT
Start: 2020-08-22 | End: 2020-08-20

## 2020-08-20 RX ORDER — CLONIDINE HYDROCHLORIDE 0.1 MG/1
0.1 TABLET ORAL 4 TIMES DAILY PRN
Status: ACTIVE | OUTPATIENT
Start: 2020-08-20 | End: 2020-08-21

## 2020-08-20 RX ORDER — QUETIAPINE FUMARATE 25 MG/1
25 TABLET, FILM COATED ORAL NIGHTLY
COMMUNITY
End: 2020-08-21 | Stop reason: HOSPADM

## 2020-08-20 RX ORDER — ACETAMINOPHEN 325 MG/1
650 TABLET ORAL EVERY 6 HOURS PRN
Status: DISCONTINUED | OUTPATIENT
Start: 2020-08-20 | End: 2020-08-21 | Stop reason: HOSPADM

## 2020-08-20 RX ORDER — LANOLIN ALCOHOL/MO/W.PET/CERES
1000 CREAM (GRAM) TOPICAL DAILY
Status: DISCONTINUED | OUTPATIENT
Start: 2020-08-20 | End: 2020-08-21 | Stop reason: HOSPADM

## 2020-08-20 RX ORDER — BUPRENORPHINE HYDROCHLORIDE AND NALOXONE HYDROCHLORIDE DIHYDRATE 8; 2 MG/1; MG/1
1 TABLET SUBLINGUAL 2 TIMES DAILY
Status: ON HOLD | COMMUNITY
End: 2020-08-21 | Stop reason: SDUPTHER

## 2020-08-20 RX ORDER — BENZTROPINE MESYLATE 1 MG/ML
1 INJECTION INTRAMUSCULAR; INTRAVENOUS ONCE AS NEEDED
Status: DISCONTINUED | OUTPATIENT
Start: 2020-08-20 | End: 2020-08-21 | Stop reason: HOSPADM

## 2020-08-20 RX ORDER — BUPRENORPHINE HYDROCHLORIDE AND NALOXONE HYDROCHLORIDE DIHYDRATE 8; 2 MG/1; MG/1
1 TABLET SUBLINGUAL 2 TIMES DAILY
Status: DISCONTINUED | OUTPATIENT
Start: 2020-08-20 | End: 2020-08-21 | Stop reason: HOSPADM

## 2020-08-20 RX ORDER — BENZONATATE 100 MG/1
100 CAPSULE ORAL 3 TIMES DAILY PRN
Status: DISCONTINUED | OUTPATIENT
Start: 2020-08-20 | End: 2020-08-21 | Stop reason: HOSPADM

## 2020-08-20 RX ORDER — TRAZODONE HYDROCHLORIDE 50 MG/1
50 TABLET ORAL NIGHTLY PRN
Status: DISCONTINUED | OUTPATIENT
Start: 2020-08-20 | End: 2020-08-21 | Stop reason: HOSPADM

## 2020-08-20 RX ORDER — HYDROXYZINE 50 MG/1
50 TABLET, FILM COATED ORAL EVERY 6 HOURS PRN
Status: DISCONTINUED | OUTPATIENT
Start: 2020-08-20 | End: 2020-08-21 | Stop reason: HOSPADM

## 2020-08-20 RX ORDER — GABAPENTIN 400 MG/1
400 CAPSULE ORAL NIGHTLY
Status: DISCONTINUED | OUTPATIENT
Start: 2020-08-20 | End: 2020-08-21 | Stop reason: HOSPADM

## 2020-08-20 RX ORDER — IBUPROFEN 400 MG/1
400 TABLET ORAL EVERY 6 HOURS PRN
Status: DISCONTINUED | OUTPATIENT
Start: 2020-08-20 | End: 2020-08-21 | Stop reason: HOSPADM

## 2020-08-20 RX ORDER — LORAZEPAM 2 MG/1
2 TABLET ORAL ONCE
Status: COMPLETED | OUTPATIENT
Start: 2020-08-20 | End: 2020-08-20

## 2020-08-20 RX ADMIN — ESCITALOPRAM 10 MG: 10 TABLET, FILM COATED ORAL at 14:49

## 2020-08-20 RX ADMIN — ACETAMINOPHEN 650 MG: 325 TABLET ORAL at 04:10

## 2020-08-20 RX ADMIN — BUPRENORPHINE HYDROCHLORIDE AND NALOXONE HYDROCHLORIDE DIHYDRATE 1 TABLET: 8; 2 TABLET SUBLINGUAL at 13:32

## 2020-08-20 RX ADMIN — BUPRENORPHINE HYDROCHLORIDE AND NALOXONE HYDROCHLORIDE DIHYDRATE 1 TABLET: 8; 2 TABLET SUBLINGUAL at 20:42

## 2020-08-20 RX ADMIN — IBUPROFEN 400 MG: 400 TABLET, FILM COATED ORAL at 12:08

## 2020-08-20 RX ADMIN — GABAPENTIN 400 MG: 400 CAPSULE ORAL at 20:42

## 2020-08-20 RX ADMIN — NICOTINE 1 PATCH: 21 PATCH TRANSDERMAL at 10:34

## 2020-08-20 RX ADMIN — LORAZEPAM 2 MG: 2 TABLET ORAL at 03:28

## 2020-08-20 RX ADMIN — Medication 1000 MCG: at 10:33

## 2020-08-20 RX ADMIN — CYCLOBENZAPRINE HYDROCHLORIDE 10 MG: 10 TABLET, FILM COATED ORAL at 12:08

## 2020-08-20 RX ADMIN — TRAZODONE HYDROCHLORIDE 50 MG: 50 TABLET ORAL at 20:42

## 2020-08-20 NOTE — NURSING NOTE
Spoke with   presenting pt clinicals.  MD advised pt does not meet criteria for admission. Careful discussion with patient about discharge. No objective or reported signs of SI or acute distress, or self harm.  Safety plan discussed, patient contracted. Offered inpatient services if felt needed.  Patient declined  Crisis number provided. Advised if change of condition or worsening of symptoms return to ed and/or seek outpt services.

## 2020-08-20 NOTE — NURSING NOTE
Patient presents to intake for psychiatric evaluation via police. Patient states “my dad called  and said I needed help.” Patient stated “I tried to kill myself about a month ago by taking a bunch of blood pressure pills and Seroquel.” Patient reports being aggressive and irritable. Patient states “I just need to have a psych evaluation.” Patient denies SI currently, denies HI and AVH. Appetite good and sleep good. A&Ox3. Rates depression 10 and anxiety 9 on a scale of 0-10. Will continue to monitor for safety.

## 2020-08-20 NOTE — PLAN OF CARE
Problem: Patient Care Overview  Goal: Plan of Care Review  Outcome: Ongoing (interventions implemented as appropriate)  Flowsheets (Taken 8/20/2020 7564)  Progress: improving  Plan of Care Reviewed With: patient  Patient Agreement with Plan of Care: agrees  Note:   Patient has been very anxious and demanding suboxone and lexapro  Goal: Individualization and Mutuality  Outcome: Ongoing (interventions implemented as appropriate)  Goal: Discharge Needs Assessment  Outcome: Ongoing (interventions implemented as appropriate)  Goal: Interprofessional Rounds/Family Conf  Outcome: Ongoing (interventions implemented as appropriate)

## 2020-08-20 NOTE — PLAN OF CARE
Problem: Patient Care Overview  Goal: Plan of Care Review  Outcome: Ongoing (interventions implemented as appropriate)  Flowsheets  Taken 8/20/2020 1422 by Rosalind Keith MSW  Consent Given to Review Plan with: SpouseAmparo  Progress: improving  Outcome Summary: Therapist met with Patient on this date to review care plan, social history, aftercare recommendation and disposition plan. Patient agreeable.  Taken 8/20/2020 0759 by Yoandy Bloom RN  Plan of Care Reviewed With: patient  Patient Agreement with Plan of Care: agrees  Goal: Individualization and Mutuality  Outcome: Ongoing (interventions implemented as appropriate)  Flowsheets  Taken 8/20/2020 1313  Patient Personal Strengths: expressive of emotions;expressive of needs;family/social support;humor;positive attitude;self-awareness;motivated for recovery  Patient Vulnerabilities: Unemployed, history of relapse, lack of healthy coping skills  Taken 8/20/2020 1422  Patient Specific Goals (Include Timeframe): Patient will be able to identify 2-3 healthy coping skills, address relapse prevention, complete aftercare plan, complete safety plan and deny SI/HI prior to discharge.  How to Address Anxieties/Fears: Patient encourged to speak with staff regarding any anxiety or fears.  Patient Specific Interventions: Therapist will offer 1-4 therapy sessions, aftercare planning, safety planning, family education daily groups and brief CBT/MI interventions.  What Information Would Help Us Give You More Personalized Care?: None voiced  How Would You and/or Your Support Person Like to Participate in Your Care?: SpouseAmparo [phone] - consent signed  What Anxieties, Fears, Concerns, or Questions Do You Have About Your Care?: None voiced  Patient Specific Preferences: None voiced  Goal: Discharge Needs Assessment  Outcome: Ongoing (interventions implemented as appropriate)  Flowsheets  Taken 8/20/2020 1422 by Rosalind Keith,  MSW  Outpatient/Agency/Support Group Needs: outpatient counseling  Discharge Coordination/Progress: Therapist met with Patient to complete discharge needs assessment; Patient wishes to follow up with [facility] at discharge. Patient will need [facility] arranged.  Transportation Anticipated: public transportation  Anticipated Discharge Disposition: home or self-care  Transportation Concerns: car, none  Current Discharge Risk: substance use/abuse  Concerns to be Addressed: mental health;discharge planning;medication;substance/tobacco abuse/use  Readmission Within the Last 30 Days: no previous admission in last 30 days  Patient/Family Anticipated Services at Transition: outpatient care  Patient's Choice of Community Agency(s): [facility]  Taken 8/20/2020 0423 by Nancy Hallman RN  Patient/Family Anticipates Transition to: home  Goal: Interprofessional Rounds/Family Conf  Outcome: Ongoing (interventions implemented as appropriate)  Flowsheets (Taken 8/20/2020 1422)  Participants: patient; psychiatrist; milieu/psych techs; social work;  Summary: Treatment Team Evaluation and Staffing     Problem: Overarching Goals (Adult)  Goal: Adheres to Safety Considerations for Self and Others  Outcome: Ongoing (interventions implemented as appropriate)  Flowsheets (Taken 8/20/2020 1422)  Adheres to Safety Considerations for Self and Others: making progress toward outcome  Goal: Optimized Coping Skills in Response to Life Stressors  Outcome: Ongoing (interventions implemented as appropriate)  Flowsheets (Taken 8/20/2020 1422)  Optimized Coping Skills in Response to Life Stressors: making progress toward outcome  Goal: Develops/Participates in Therapeutic [place] to Support Successful Transition  Outcome: Ongoing (interventions implemented as appropriate)  Flowsheets (Taken 8/20/2020 1422)  Develops/Participates in Therapeutic [place] to Support Successful Transition: making progress toward outcome  Intervention: Foster Therapeutic  [place]  Flowsheets (Taken 8/20/2020 1422)  Trust Relationship/Rapport: care explained; choices provided; emotional support provided; empathic listening provided; questions answered; questions encouraged; reassurance provided; thoughts/feelings acknowledged  Intervention: Mutually Develop Transition Plan  Flowsheets (Taken 8/20/2020 1422)  Transition Support: community resources reviewed; crisis management plan promoted; crisis management plan verbalized; follow-up care discussed      1300:    DATA:      Therapist met individually with Patient this date for initial evaluation.  Introduced self as Therapist and the role of a positive therapeutic relationship; Patient agreeable.    Therapist encouraged Patient to speak openly and honestly about any issues or stressors during treatment stay. Therapist explained how open communication is significant to providing most effective care.      Therapist completed psychosocial assessment, integrated summary, reviewed care plans, disposition planning and discussed hospitalization expectations and treatment goals this date.     Therapist provided education regarding different levels of care and is recommending residential treatment for most appropriate aftercare. Patient not agreeable and wishes to follow up with [facility] for outpatient counseling. Patient signed consent for [facility].     Therapist is recommending family involvement prior to discharge and it's importance. Patient agreeable and signed consent for his spouse, Amparo Bazan.    Therapist spoke with Patient's spouse, Amparo on this date to complete safety planning and the current issues at hand. Patient's spouse states Patient has had a lot going on and has really struggled with the passing of her brother in recent months. Patient's spouse states Patient had a suicide attempt around a month ago after binging on Methamphetamine and not sleeping for a few days. Patient's spouse states she feels safe  with Patient returning home when ready for discharge. Patient's spouse was really supportive of Patient seeking treatment.       CLINICAL MANUVERING/INTERVENTIONS:  Assisted Patient in processing session content; acknowledged and normalized Patient’s thoughts, feelings, and concerns by utilizing a person-centered approach in efforts to build appropriate rapport and a positive therapeutic relationship with open and honest communication. Allowed Patient to ventilate regarding current stressors and triggers for negative emotions and thoughts in a safe nonjudgmental environment with unconditional positive regard, active listening skills, and empathy.     ASSESSMENT:     Jamel Shrestha is a 39 year old , unemployed  male who was brought to the ED by the police for a mental health evaluation. Therapist met with Patient in the office on this date. Patient was calm and cooperative during assessment. Patent reports he tried to kill himself about a month ago by taking several blood pressure pills along with Seroquel. Patient reports taking the medication at his home and then leaving so he could get away from his wife and parents, walking to a friend's house where he collapsed and EMS was called. Patient reports he spent 6 or 7 days in CCU at the [facility]. Patient reported he was discharged home thinking he could get help on his own, but was not able to. Patient reports he is hoping to be started on medication while he is here. Patient appears remorseful about the overdose and states it has just been hard since the loss of his brother in law and having to watch his wife suffer. Patient's UDS is positive for Amphetamines, Buprenorphine and THC. Patient reports he is prescribed Suboxone from [facility] in [place], but also admits to using Methamphetamine once or twice a week, but states he would use it every day if he had access to it. Patient is unsure how much Methamphetamine he uses at a time but  "states it's \"not much.\" Patient states he had a good childhood and denies any history or abuse or neglect. Patient reports he began experimenting with drugs in high school and became addicted to pain pills. Patient is currently unemployed and states that contributes to the substance abuse as well. Patient reports he wants to find a job. Patient rates his anxiety a 7 or 8 out of 10 and his depression a 6 or 7 out of 10. Patient reports hearing voices in his head last night, but that is just sounded like chatter and he was not able to make out what they were saying. Patient reports feeling paranoid and felt like someone was out to kill him last night. Patient reports having a history of treatment at [facility] in [place] and states he finished the program and was doing well until he moved back to [place] with his parents where he ended up relapsing.  Patient has two children that are in the custody of his sister and two older children in which he does not get to see. Patient denies the need for residential treatment and states he wishes to follow up with [facility]. Patient states him and his spouse are thinking about going to stay in a shelter to get out of his parents house.     PLAN:   Patient will receive 24/7 nursing monitoring and daily psychiatrist evaluation by a multidisciplinary team.    Patient will continue stabilization at this time.     Patient is agreeable for outpatient services with [facility]  -consent signed.     [facility] will be needed at discharge.   "

## 2020-08-20 NOTE — NURSING NOTE
Spoke with Gricel ALONSO, advised that she would concerned for patients safety because he had expressed worsening depression. Advised to call Dr. Freeman back and represent the patient with this information.

## 2020-08-20 NOTE — H&P
INITIAL PSYCHIATRIC HISTORY & PHYSICAL    Patient Identification:  Name:  Jamel Shrestha  Age:  39 y.o.  Sex:  male  :  1980  MRN:  [id number]   Visit Number:  [id number]  Primary Care Physician:  Cassi De La Cruz APRN    SUBJECTIVE    CC/Focus of Exam: Worsening depression and history of suicide attempt    HPI: Jamel Shrestha is a 39 y.o. male who was admitted on 2020 with complaints of worsening depression and anxiety and reported a recent suicide attempt and was worried that if he did not get help he could hurt himself again. He reports he has been depressed for the last six or seven months. States due to Covid-19 he is unable to work and is facing financial stress, and family problems. Also recently lost his brother in law (wife's brother) and he was very close to him and it made him feel more depressed. States due to financial problems, things are not going well in his marriage and there are a lot of arguments and the relationship is strained. He reports poor sleep, variable appetite, feeling hopeless, worthless, guilt, no energy, difficulty with attention and concentration and suicidal thoughts. He states he felt he needed to get help before he ended up hurting himself again. He admits that his drug use is adding to his depression and though he is trying to stop using meth, he has had multiple relapses.      PAST PSYCHIATRIC HX: No previous psych treatment reported. Reports taking an overdose of blood pressure pills last month after an argument with his wife and was admitted to the [facility] for a week. Has been in rehab treatment at [facility] 3-4 years ago.     SUBSTANCE USE HX: The patient reports a history of meth use for a couple of years. Reports negative consequences associated with his use and has tried to quit and has had multiple relapses and last use was three days ago. He also reports a history of opioid use starting at age 11. States he has been on medication  assisted treatment on buprenorphine for last 7 years and states that he takes his Suboxone as prescribed and plans to continue in treatment as it has helped him stay away from illicit drug use. Also smokes a joint of marijuana daily along with 2 packs/day of cigarettes.    SOCIAL HX:   Living situation: lives with parents and wife in parents house  Employment: unemployed   Education: 11th grade, quit because he started working  Sexual orientation: heterosexual  Marital Status:  for last seven years  Children: has four kids ages 16, 14, 12, 10 from two different relationship  Legal History: been to snf previously for child support, and PI fine. No current court date or legal problems.    Trauma History: denies    History:  denies  [employer]: Non [employer] attending [employer]  Access to firearms: Yes        Past Medical History:   Diagnosis Date   • Anxiety    • Arthritis    • Depression    • Rheumatoid arthritis (CMS/HCC)    • Substance abuse (CMS/HCC)    • Withdrawal symptoms, drug or narcotic (CMS/HCC)         History reviewed. No pertinent surgical history.    Family History   Problem Relation Age of Onset   • Bipolar disorder Mother    • Drug abuse Mother    • Drug abuse Father    • No Known Problems Sister    • No Known Problems Brother    • No Known Problems Maternal Aunt    • No Known Problems Paternal Aunt    • No Known Problems Maternal Uncle    • No Known Problems Paternal Uncle    • No Known Problems Maternal Grandfather    • No Known Problems Maternal Grandmother    • No Known Problems Paternal Grandfather    • No Known Problems Paternal Grandmother    • No Known Problems Cousin          Medications Prior to Admission   Medication Sig Dispense Refill Last Dose   • buprenorphine-naloxone (SUBOXONE) 8-2 MG per SL tablet Place 1 tablet under the tongue 2 (two) times a day.   8/19/2020 at PM   • gabapentin (NEURONTIN) 400 MG capsule Take 400 mg by mouth Every Night.   8/19/2020 at  PM   • QUEtiapine (SEROquel) 25 MG tablet Take 25 mg by mouth Every Night.   8/19/2020 at PM   • vitamin B-12 (CYANOCOBALAMIN) 1000 MCG tablet Take 1,000 mcg by mouth Daily.   8/19/2020 at AM         ALLERGIES:  Patient has no known allergies.    Temp:  [97.1 °F (36.2 °C)-99.2 °F (37.3 °C)] 97.1 °F (36.2 °C)  Heart Rate:  [74-90] 75  Resp:  [16-18] 18  BP: (113-159)/(72-96) 113/72    REVIEW OF SYSTEMS:  Review of Systems   Constitutional: Positive for fatigue.   HENT: Negative.    Eyes: Negative.    Respiratory: Negative.    Cardiovascular: Negative.    Gastrointestinal: Negative.    Endocrine: Negative.    Genitourinary: Negative.    Musculoskeletal: Negative.    Skin: Negative.    Allergic/Immunologic: Negative.    Neurological: Negative.    Hematological: Negative.    Psychiatric/Behavioral: Positive for dysphoric mood. The patient is nervous/anxious.         OBJECTIVE    PHYSICAL EXAM:  Physical Exam  Constitutional:  Appears well-developed and well-nourished.   HENT:   Head: Normocephalic and atraumatic.   Right Ear: External ear normal.   Left Ear: External ear normal.   Mouth/Throat: Oropharynx is clear and moist.   Eyes: Pupils are equal, round, and reactive to light. Conjunctivae and EOM are normal.   Neck: Normal range of motion. Neck supple.   Cardiovascular: Normal rate, regular rhythm and normal heart sounds.    Pulmonary/Chest: Effort normal and breath sounds normal. No respiratory distress. No wheezes.   Abdominal: Soft. Bowel sounds are normal.No distension. There is no tenderness.   Musculoskeletal: Normal range of motion. No edema or deformity.   Neurological:No cranial nerve deficit. Coordination normal.   Skin: Skin is warm and dry. No rash noted. No erythema.       MENTAL STATUS EXAM:    Hygiene:   fair  Cooperation:  Cooperative  Eye Contact:  Fair  Psychomotor Behavior:  Appropriate  Affect:  Restricted  Hopelessness: 6  Speech:  Normal  Thought Progress:  Goal directed  Thought Content:   Normal  Suicidal:  None  Homicidal:  None  Hallucinations:  None  Delusion:  None  Memory:  Intact  Orientation:  Person, Place, Time and Situation  Reliability:  fair  Insight:  Fair  Judgement:  Fair  Impulse Control:  Fair      Imaging Results (Last 24 Hours)     ** No results found for the last 24 hours. **           ECG/EMG Results (most recent)     Procedure Component Value Units Date/Time    ECG 12 Lead [[id number]] Collected:  08/20/20 0450     Updated:  08/20/20 0456    Narrative:       Test Reason : Baseline Cardiac Status  Blood Pressure : **/** mmHG  Vent. Rate : 062 BPM     Atrial Rate : 062 BPM     P-R Int : 140 ms          QRS Dur : 100 ms      QT Int : 458 ms       P-R-T Axes : 007 055 026 degrees     QTc Int : 464 ms    Normal sinus rhythm  Normal ECG  No previous ECGs available    Referred By:  LATESHA           Confirmed By:            Lab Results   Component Value Date    GLUCOSE 97 08/19/2020    BUN 19 08/19/2020    CREATININE 0.84 08/19/2020    EGFRIFNONA 101 08/19/2020    BCR 22.6 08/19/2020    CO2 27.1 08/19/2020    CALCIUM 9.3 08/19/2020    ALBUMIN 4.83 08/19/2020    AST 29 08/19/2020    ALT 22 08/19/2020       Lab Results   Component Value Date    WBC 7.36 08/19/2020    HGB 13.5 08/19/2020    HCT 41.0 08/19/2020    MCV 91.3 08/19/2020     08/19/2020       Last Urine Toxicity     LAST URINE TOXICITY RESULTS Latest Ref Rng & Units 8/20/2020 3/19/2018    AMPHETAMINES SCREEN, URINE Negative Positive(A) Negative    BARBITURATES SCREEN Negative Negative Negative    BENZODIAZEPINE SCREEN, URINE Negative Negative Negative    BUPRENORPHINEUR Negative Positive(A) Negative    COCAINE SCREEN, URINE Negative Negative Negative    METHADONE SCREEN, URINE Negative Negative Negative          Brief Urine Lab Results  (Last result in the past 365 days)      Color   Clarity   Blood   Leuk Est   Nitrite   Protein   CREAT   Urine HCG        08/20/20 0041 Dark Yellow Clear Negative Trace Negative 30 mg/dL  (1+)               DATA  Labs reviewed. CMP normal, CBC normal, UA shows dark yellow color, trace leukocyte esterase, 1+ protein, 1+ bilirubin, 3-5 RBCs, 6-12 WBCs. UDS positive for amphetamine, buprenorphine, thc.  EKG reviewed. QTc 464.  RENATO reviewed.  Patient is in regular treatment at the [facility] and getting Suboxone 8-2 mg pills 2 daily.  Last prescription was dated August 7, 2020 for 2 weeks.  Record reviewed.  No previous treatments noted in this hospital.  The patient had an appointment for outpatient behavioral clinic in November 2016 but he did not show up.    Strengths: Motivated for treatment    Weaknesses:Unemployed, Substance use and Poor coping skills    Code status:  Full  Discussed code status with patient.    ASSESSMENT & PLAN:        Severe episode of recurrent major depressive disorder, without psychotic features (CMS/HCC)  - Start Lexapro  - Stop Seroquel      Severe opioid use disorder on maintenance therapy (CMS/HCC)  - Continue Suboxone      Methamphetamine use disorder, severe (CMS/HCC)  - Supportive treatment  - Encourage cessation      THC use disorder, severe, dependence (CMS/[facility])  - Supportive treatment  - Encourage cessation      Tobacco use disorder  - Nicoderm patch  - Encourage cessation        The patient has been admitted for safety and stabilization.  Patient will be monitored for suicidality daily and maintained on Special Precautions Level 3 (q15 min checks) .  The patient will have individual and group therapy with a master's level therapist. A master treatment plan will be developed and agreed upon by the patient and his/her treatment team.  The patient's estimated length of stay in the hospital is 5-7 days.

## 2020-08-20 NOTE — NURSING NOTE
Patient pockets emptied. Search completed with two staff members present. Items logged and placed in cabinet in intake area. Pt placed in safe room for evaluation. Will continue to monitor pt status. Waiting for ED provider to clear pt medically

## 2020-08-20 NOTE — ED PROVIDER NOTES
Subjective   Patient is a 39-year-old male with substance abuse, depression and anxiety that presents the ED with complaints of worsening depression and anxiety.  He states approximately 3 weeks ago he tried to kill himself by overdosing on drugs.  He states in the last several days he has had worsening depression and he is worried that he may try to hurt himself again.  He denies homicidal ideations.  He denies chest pain, shortness of breath, fever, chills, cough, nausea, vomiting, headache, abdominal pain, or dysuria.      History provided by:  Patient   used: No    Mental Health Problem   Presenting symptoms: depression and suicidal thoughts    Presenting symptoms: no aggressive behavior, no suicidal threats and no suicide attempt    Degree of incapacity (severity):  Moderate  Onset quality:  Sudden  Duration:  1 day  Timing:  Constant  Progression:  Worsening  Chronicity:  Recurrent  Context: drug abuse    Context: not alcohol use and not recent medication change    Treatment compliance:  All of the time  Relieved by:  Nothing  Worsened by:  Nothing  Ineffective treatments:  None tried  Associated symptoms: anxiety and feelings of worthlessness    Associated symptoms: no abdominal pain, no chest pain and no headaches    Risk factors: hx of mental illness and hx of suicide attempts        Review of Systems   Constitutional: Negative.  Negative for chills, diaphoresis and fever.   HENT: Negative.  Negative for congestion, ear discharge, ear pain, facial swelling, postnasal drip, rhinorrhea, sinus pressure, sinus pain, sneezing, sore throat, tinnitus and trouble swallowing.    Eyes: Negative.  Negative for pain, discharge, redness and itching.   Respiratory: Negative.  Negative for cough, chest tightness, shortness of breath and wheezing.    Cardiovascular: Negative.  Negative for chest pain.   Gastrointestinal: Negative.  Negative for abdominal pain, diarrhea, nausea and vomiting.   Endocrine:  "Negative.    Genitourinary: Negative.  Negative for dysuria, flank pain and frequency.   Musculoskeletal: Negative.    Skin: Negative.    Neurological: Negative.  Negative for dizziness, syncope, weakness, light-headedness and headaches.   Psychiatric/Behavioral: Positive for dysphoric mood and suicidal ideas. Negative for confusion. The patient is nervous/anxious.    All other systems reviewed and are negative.      Past Medical History:   Diagnosis Date   • Anxiety    • Arthritis    • Depression    • Rheumatoid arthritis (CMS/[facility])    • Substance abuse (CMS/[facility])    • Withdrawal symptoms, drug or narcotic (CMS/[facility])        No Known Allergies    History reviewed. No pertinent surgical history.    Family History   Problem Relation Age of Onset   • Bipolar disorder Mother    • Drug abuse Mother    • Drug abuse Father    • No Known Problems Sister    • No Known Problems Brother    • No Known Problems Maternal Aunt    • No Known Problems Paternal Aunt    • No Known Problems Maternal Uncle    • No Known Problems Paternal Uncle    • No Known Problems Maternal Grandfather    • No Known Problems Maternal Grandmother    • No Known Problems Paternal Grandfather    • No Known Problems Paternal Grandmother    • No Known Problems Cousin        Social History     Socioeconomic History   • Marital status:      Spouse name: Not on file   • Number of children: Not on file   • Years of education: Not on file   • Highest education level: Not on file   Tobacco Use   • Smoking status: Current Every Day Smoker     Packs/day: 2.00     Years: 15.00     Pack years: 30.00     Types: Cigarettes   • Smokeless tobacco: Never Used   • Tobacco comment: Pt. declines counseling at this time.    Substance and Sexual Activity   • Alcohol use: Not Currently   • Drug use: Yes     Types: IV, Hydrocodone, Oxycodone, Methamphetamines, Benzodiazepines     Comment: Suboxone, Neurontin, meth, \"nerve pills sometimes\"   • Sexual activity: Yes     " Partners: Female     Birth control/protection: None     Comment:            Objective   Physical Exam   Constitutional: He is oriented to person, place, and time. He appears well-developed and well-nourished. No distress.   HENT:   Head: Normocephalic and atraumatic.   Right Ear: External ear normal.   Left Ear: External ear normal.   Nose: Nose normal.   Eyes: Pupils are equal, round, and reactive to light. Conjunctivae and EOM are normal.   Neck: Normal range of motion. Neck supple. No JVD present. No tracheal deviation present.   Cardiovascular: Normal rate, regular rhythm and normal heart sounds.   No murmur heard.  Pulmonary/Chest: Effort normal and breath sounds normal. No respiratory distress. He has no wheezes.   Abdominal: Soft. Bowel sounds are normal. He exhibits no distension. There is no tenderness. There is no rebound and no guarding.   Musculoskeletal: Normal range of motion. He exhibits no edema or deformity.   Neurological: He is alert and oriented to person, place, and time. No cranial nerve deficit.   Skin: Skin is warm and dry. No rash noted. He is not diaphoretic. No erythema. No pallor.   Psychiatric: His behavior is normal. His mood appears anxious. His speech is rapid and/or pressured. He exhibits a depressed mood. He expresses suicidal ideation.   Nursing note and vitals reviewed.      Procedures           ED Course  ED Course as of Aug 20 0458   Thu Aug 20, 2020   0239 Discussed case with Dr. Freeman.  Given his recent suicidal attempt and current worsening depression and concerned that he may hurt himself again he does agree that admissions is warranted for suicidal ideations.    [MH]   0306 Medically cleared for intake    []   0323 Discussed at great length with patient.  He is agreeable for admission at this time.    [BC]      ED Course User Index  [BC] Candelario Trejo MD  [] Gricel Cleary PA-C MDM    Final diagnoses:    Depression with suicidal ideation            Gricel Cleary PA-C  08/20/20 9145

## 2020-08-20 NOTE — NURSING NOTE
Spoke to Dr. Freeman via phone, previous discharge orders canceled,  Intake information provided including labs and pt. history. Instructed to admit the patient. Admit orders received. SP3 routine orders, clonidine detox orders  RBVOx2.. Patient and ed provider made aware of plan of care. Safety precautions maintained.

## 2020-08-21 VITALS
HEART RATE: 71 BPM | WEIGHT: 161.8 LBS | DIASTOLIC BLOOD PRESSURE: 66 MMHG | OXYGEN SATURATION: 96 % | TEMPERATURE: 97.7 F | RESPIRATION RATE: 16 BRPM | BODY MASS INDEX: 21.44 KG/M2 | HEIGHT: 73 IN | SYSTOLIC BLOOD PRESSURE: 100 MMHG

## 2020-08-21 PROCEDURE — 99239 HOSP IP/OBS DSCHRG MGMT >30: CPT | Performed by: PSYCHIATRY & NEUROLOGY

## 2020-08-21 RX ORDER — ESCITALOPRAM OXALATE 10 MG/1
10 TABLET ORAL DAILY
Qty: 30 TABLET | Refills: 0 | Status: SHIPPED | OUTPATIENT
Start: 2020-08-22

## 2020-08-21 RX ORDER — BUPRENORPHINE HYDROCHLORIDE AND NALOXONE HYDROCHLORIDE DIHYDRATE 8; 2 MG/1; MG/1
1 TABLET SUBLINGUAL 2 TIMES DAILY
Qty: 4 TABLET | Refills: 0 | Status: SHIPPED | OUTPATIENT
Start: 2020-08-21

## 2020-08-21 RX ORDER — TRAZODONE HYDROCHLORIDE 50 MG/1
50 TABLET ORAL NIGHTLY PRN
Qty: 30 TABLET | Refills: 0 | Status: SHIPPED | OUTPATIENT
Start: 2020-08-21

## 2020-08-21 RX ADMIN — ESCITALOPRAM 10 MG: 10 TABLET, FILM COATED ORAL at 09:24

## 2020-08-21 RX ADMIN — Medication 1000 MCG: at 09:24

## 2020-08-21 RX ADMIN — NICOTINE 1 PATCH: 21 PATCH TRANSDERMAL at 09:24

## 2020-08-21 RX ADMIN — BUPRENORPHINE HYDROCHLORIDE AND NALOXONE HYDROCHLORIDE DIHYDRATE 1 TABLET: 8; 2 TABLET SUBLINGUAL at 09:24

## 2020-08-21 NOTE — PROGRESS NOTES
[facility]: Patient is being discharged on this day.     Contacted [facility] for transportation. Patient coded to a different [facility] and 30 day permission for [facility] to transport has . Trip will need to be paid cash or billed to the hospital.

## 2020-08-21 NOTE — PROGRESS NOTES
1400:    Patient is not able to utilize [facility] unless he private pays. Therapist spoke with Patient who requests his spouse to be contacted about family coming to get him. Therapist spoke with Patient's spouse, Amparo who states she can get someone to come and get him. Patient made aware.

## 2020-08-21 NOTE — DISCHARGE INSTR - APPOINTMENTS
[address] [place] [address]  [phone]    August 31 2020 at 12:45pm with Kendy  You can either come to the office in person at this time or call the office for the appointment.

## 2020-08-21 NOTE — PLAN OF CARE
Problem: Patient Care Overview  Goal: Discharge Needs Assessment  Outcome: Ongoing (interventions implemented as appropriate)  Flowsheets  Taken 8/20/2020 1422 by Rosalind Keith MSW  Outpatient/Agency/Support Group Needs: outpatient counseling  Anticipated Discharge Disposition: home or self-care  Patient/Family Anticipated Services at Transition: outpatient care  Taken 8/21/2020 1318 by Rosalind Keith MSW  Discharge Coordination/Progress: Patient will be dischaged home on this date. A folow up appointment has been made with Kelvin FOWLER.  Patient's Choice of Community Agency(s): [facility]  Taken 8/20/2020 0423 by Nancy Hallman RN  Patient/Family Anticipates Transition to: home  Goal: Interprofessional Rounds/Family Conf  Outcome: Ongoing (interventions implemented as appropriate)  Flowsheets (Taken 8/21/2020 1318)  Participants: psychiatrist; social work; patient  Summary: Staffed case with Dr. Topete on this date. Patient denies SI/HI and wishes to be discharged home on this date.     Problem: Overarching Goals (Adult)  Goal: Develops/Participates in Therapeutic [place] to Support Successful Transition  Outcome: Ongoing (interventions implemented as appropriate)  Flowsheets (Taken 8/20/2020 1422)  Develops/Participates in Therapeutic [place] to Support Successful Transition: making progress toward outcome  Intervention: Foster Therapeutic [place]  Flowsheets (Taken 8/21/2020 0750 by Blanca Pendleton, RN)  Trust Relationship/Rapport: care explained;choices provided;emotional support provided;empathic listening provided;questions answered;questions encouraged;reassurance provided;thoughts/feelings acknowledged  Intervention: Mutually Develop Transition Plan  Flowsheets (Taken 8/21/2020 1318)  Transition Support: community resources reviewed; crisis management plan promoted; crisis management plan verbalized; follow-up care coordinated; follow-up care discussed         0915:    DATA: Therapist  met with Patient individually this date. Patient agreeable to discuss current treatment progress and discharge concerns. Patient denies the need for residential treatment and wants to follow up with Kelvin TOWNSEND - appointment has been made. Patient states he will return to his parent's house.     CLINICAL MANUVERING/INTERVENTIONS:  Assisted Patient in processing session content; acknowledged and normalized Patient’s thoughts, feelings, and concerns by utilizing a person-centered approach in efforts to build appropriate rapport and a positive therapeutic relationship with open and honest communication. Allowed Patient to ventilate regarding current stressors and triggers for negative emotions and thoughts in a safe nonjudgmental environment with unconditional positive regard, active listening skills, and empathy. Therapist implemented motivational interviewing techniques to assist Patient with exploring personal growth and change and discussed distress tolerance skills, self soothing techniques, and applied cognitive behavioral strategies to facilitate identification of maladaptive patterns of thinking and behavior.Therapist utilized dialectical behavior techniques to teach and model emotional regulation and relaxation methods. Therapist assisted Patient with identifying and implementing healthier coping strategies. Therapist assisted Patient with safety planning; Patient agreed to continue honest communication with Treatment Team while inpatient and identify any SI/HI.  Patient encouraged to seek nearest ER or contact 911 if danger to self or others post discharge.     ASSESSMENT: Today, Patient was observed to be lying in bed awake. Patient was calm and cooperative. Patient expressed concern about when he will be discharged. Patient states it is his anniversary today and that his kids will be at his parents' house and he would really like to seem them. Patient denies SI/HI and rates his anxiety a 4/10 and his  "depression a 5/10. Patient reports he feels much better after receiving his Suboxone and states he is also been started on Lexipro and feels \"better about taking it now.\" Therapist discussed relapse prevention with Patient and Patient states he may go stay with his sister in [place], [place] because he does not know as many people there. Patient states he plans to talk to his established therapist at [facility] and build a relationship as a way to cope with life stressors. Patient states he feel like his anger is sometimes an issue and has been thinking about going to anger management classes. Patient appeared remorseful regarding his past suicide attempt and states he is feeling much better now.     PLAN:  Patient will continue stabilization. Patient will continue to receive services offered by Treatment Team.     Patient will follow-up with [facility] - appointment made.    [facility] will provide transportation.   "

## 2020-08-21 NOTE — PLAN OF CARE
Rated anxiety as 8 and depression as 6. Denies S.I. Says he misses his family. Gets more anxious around a lot of people. Slept approx. 7 hours this shift.

## 2020-08-22 NOTE — PROGRESS NOTES
Behavioral Health Discharge Summary             Please fax within 24 hours of discharge to [facility] at: [phone]      Member Name: Jamel Shrestha Member ID: [id number]   Authorization Number: [id number] Phone: [phone]   Member Address: [place] [address]   Discharge Date: 08/21/2020 Level of Care at Discharge: OUTPATIENT   Facility:DISCHARGING FROM [facility] Staff Completing Form:   MAGDALENA SANCHEZ RN. U.R.   If the member is being discharged directly to a residential or extended care program, please specify the type below.   __Private Child-Caring Facility (PCC) Residential/Group Home   __Private Child-Caring Facility (PCC) Therapeutic Foster Care   __Residential Treatment Facility (RTF)   __Psychiatric Residential Treatment Facility (PRTF I or II)   __Long-Term Acute Inpatient Hospital Services or Extended Care Unit (ECU)   __Other (please specify):    Brief discharge summary of treatment received (for follow up by the case management team): D/C clinical with list of medications and follow up appts given to patient upon discharge.     BRIEF SUMMARY OF RECOMMENDATIONS FOR ONGOING TREATMENT     Discharged to where: HOME   Discharge diagnoses:    Axis I: F33.2, F11.20, F15.20, F12.20   Axis II:    Axis III:    Axis IV:    Axis V:    Does the member understand his/her DX?  Yes          Medication     Dose     Schedule Supply/  Quantity  Given at Discharge RX Provided  Yes/No  If Rx Provided, Quantity RX Prior Auth Required  Yes/No Prior Auth  Completed   LEXAPRO 10 MG DAILY        SUBOXONE  8-2MG TWICE DAILY        NEURONTIN 400 MG EVERY NIGHT        TRAZODONE 50MG AT NIGHT PRN                                                          Does the member understand the reason for taking these medications? Yes                                                           FOLLOW-UP APPOINTMENTS   Please schedule within 7 days of discharge and provide appointment details for  all referred services.    PCP/Other Providers Involved in Treatment:    Appointment Type:         OUTPATIENT Provider Name:       MAUDE JANETH Provider Phone:        [phone]  Appointment Date:       08/31/2020 Appointment Time:         12:45PM WITH DIANE Recinos   (new to OP services)        Case Management    Is the member already enrolled in case management?  Yes/No  If yes, date the CM was notified:    If no, was the CM referral offered?  Yes/No  Accepted? Yes/No    Is the Release of Information in the chart? Yes/No:      Medication Management (for member discharged with psychiatric medications):      A&D Treatment (for member with substance abuse/   dependence in the past year):      Medical Condition (for member with a medical condition):    Other recommended treatment:    Do you have any concerns about the discharge plan?  No    If yes, explain:    Was the member involved in the discharge planning?  Yes    If no, explain:    Was a copy of the discharge plan provided to the member?  Yes    If no, explain:

## 2020-08-28 NOTE — DISCHARGE SUMMARY
"      PSYCHIATRIC DISCHARGE SUMMARY     Patient Identification:  Name:  Jamel Shrestha  Age:  39 y.o.  Sex:  male  :  1980  MRN:  [id number]  Visit Number:  [id number]    Date of Admission:2020   Date of Discharge: 2020     Discharge Diagnosis:  Active Problems:    Severe episode of recurrent major depressive disorder, without psychotic features (CMS/HCC)    Severe opioid use disorder on maintenance therapy (CMS/HCC)    Methamphetamine use disorder, severe (CMS/HCC)    Tobacco use disorder    Tetrahydrocannabinol (THC) use disorder, severe, dependence (CMS/HCC)      Admission Diagnosis:  MDD (major depressive disorder) [F32.9]     Hospital Course  Patient is a 39 y.o. male presented with depression, anxiety & recent suicidal gesture. Admitted for crisis stabilization. Significant family and financial distress contributed to decompensation. Home quetiapine stopped and escitalopram started. Patient showed rapid resolution of symptoms, specifically after speaking with family, who contracted for his safety. Patient largely overwhelmed by recent distress and denied active suicidality, reporting that he needed to return home to get to work and support his family.  Outpatient care was established.    On the day of discharge, patient denied SI, HI or AVH.  Patient showed improvement of presenting symptoms and was deemed appropriate for discharge today.    Mental Status Exam upon discharge:   Mood \" better\"   Affect-congruent, appropriate, stable  Thought Content-goal directed, no delusional material present  Thought process-linear, organized.  Suicidality: No SI  Homicidality: No HI  Perception: No AH/VH    Procedures Performed         Consults:   Consults     No orders found from 2020 to 2020.          Pertinent Test Results:   Lab Results (last 7 days)     ** No results found for the last 168 hours. **          Condition on Discharge:  improved    Vital Signs       Discharge Disposition:  Home or " Self Care    Discharge Medications:     Discharge Medications      New Medications      Instructions Start Date   escitalopram 10 MG tablet  Commonly known as:  LEXAPRO   10 mg, Oral, Daily      traZODone 50 MG tablet  Commonly known as:  DESYREL   50 mg, Oral, Nightly PRN         Continue These Medications      Instructions Start Date   buprenorphine-naloxone 8-2 MG per SL tablet  Commonly known as:  SUBOXONE   1 tablet, Sublingual, 2 times daily      gabapentin 400 MG capsule  Commonly known as:  NEURONTIN   400 mg, Oral, Nightly      vitamin B-12 1000 MCG tablet  Commonly known as:  CYANOCOBALAMIN   1,000 mcg, Oral, Daily         Stop These Medications    QUEtiapine 25 MG tablet  Commonly known as:  SEROquel            Discharge Diet: Normal  Diet Instructions     As tolerated                Activity at Discharge: Normal  Activity Instructions     As tolerated                Follow-up Appointments  No future appointments.      Test Results Pending at Discharge  None     Time: I spent greater than 30 minutes minutes on this discharge activity which included: face-to-face encounter with the patient, reviewing the data in the system, coordination of the care with the nursing staff as well as consultants, documentation, and entering orders.      Clinician:   Emmett Topete MD  08/27/20  22:00

## 2020-09-30 ENCOUNTER — TRANSCRIBE ORDERS (OUTPATIENT)
Dept: ADMINISTRATIVE | Facility: HOSPITAL | Age: 40
End: 2020-09-30

## 2020-09-30 DIAGNOSIS — E04.9 THYROID GOITER: Primary | ICD-10-CM

## 2020-09-30 DIAGNOSIS — R13.14 DYSPHAGIA, PHARYNGOESOPHAGEAL PHASE: ICD-10-CM

## 2020-10-09 ENCOUNTER — TRANSCRIBE ORDERS (OUTPATIENT)
Dept: ADMINISTRATIVE | Facility: HOSPITAL | Age: 40
End: 2020-10-09

## 2020-10-09 DIAGNOSIS — Z01.818 OTHER SPECIFIED PRE-OPERATIVE EXAMINATION: Primary | ICD-10-CM

## 2020-10-13 ENCOUNTER — LAB (OUTPATIENT)
Dept: LAB | Facility: HOSPITAL | Age: 40
End: 2020-10-13

## 2020-10-13 DIAGNOSIS — Z01.818 OTHER SPECIFIED PRE-OPERATIVE EXAMINATION: ICD-10-CM

## 2020-10-15 ENCOUNTER — APPOINTMENT (OUTPATIENT)
Dept: ULTRASOUND IMAGING | Facility: HOSPITAL | Age: 40
End: 2020-10-15

## 2020-10-15 ENCOUNTER — APPOINTMENT (OUTPATIENT)
Dept: GENERAL RADIOLOGY | Facility: HOSPITAL | Age: 40
End: 2020-10-15